# Patient Record
Sex: FEMALE | Race: WHITE | NOT HISPANIC OR LATINO | RURAL
[De-identification: names, ages, dates, MRNs, and addresses within clinical notes are randomized per-mention and may not be internally consistent; named-entity substitution may affect disease eponyms.]

---

## 2022-12-20 ENCOUNTER — LAB OUTSIDE AN ENCOUNTER (OUTPATIENT)
Dept: RURAL CLINIC 4 | Facility: CLINIC | Age: 57
End: 2022-12-20

## 2022-12-20 ENCOUNTER — OFFICE VISIT (OUTPATIENT)
Dept: RURAL CLINIC 4 | Facility: CLINIC | Age: 57
End: 2022-12-20
Payer: COMMERCIAL

## 2022-12-20 ENCOUNTER — DASHBOARD ENCOUNTERS (OUTPATIENT)
Age: 57
End: 2022-12-20

## 2022-12-20 VITALS
SYSTOLIC BLOOD PRESSURE: 160 MMHG | BODY MASS INDEX: 32.96 KG/M2 | HEART RATE: 91 BPM | TEMPERATURE: 97.1 F | HEIGHT: 67 IN | DIASTOLIC BLOOD PRESSURE: 101 MMHG | WEIGHT: 210 LBS

## 2022-12-20 DIAGNOSIS — R13.19 ESOPHAGEAL DYSPHAGIA: ICD-10-CM

## 2022-12-20 DIAGNOSIS — K92.1 MELENA: ICD-10-CM

## 2022-12-20 DIAGNOSIS — K51.918 ULCERATIVE COLITIS WITH OTHER COMPLICATION, UNSPECIFIED LOCATION: ICD-10-CM

## 2022-12-20 DIAGNOSIS — R19.8 TENESMUS (RECTAL): ICD-10-CM

## 2022-12-20 PROCEDURE — 99204 OFFICE O/P NEW MOD 45 MIN: CPT | Performed by: INTERNAL MEDICINE

## 2022-12-20 RX ORDER — ALBUTEROL SULFATE 90 UG/1
1 PUFF AS NEEDED AEROSOL, METERED RESPIRATORY (INHALATION)
Status: ACTIVE | COMMUNITY

## 2022-12-20 RX ORDER — SODIUM, POTASSIUM,MAG SULFATES 17.5-3.13G
177ML SOLUTION, RECONSTITUTED, ORAL ORAL
Qty: 1 | Refills: 0 | OUTPATIENT
Start: 2022-12-20 | End: 2022-12-22

## 2022-12-20 RX ORDER — PANTOPRAZOLE SODIUM 40 MG/1
1 TABLET TABLET, DELAYED RELEASE ORAL TWICE A DAY
Qty: 60 TABLET | Refills: 4 | OUTPATIENT
Start: 2022-12-20

## 2022-12-20 RX ORDER — HYDROXYZINE PAMOATE 25 MG/1
1 CAPSULE AT BEDTIME AS NEEDED CAPSULE ORAL ONCE A DAY
Status: ACTIVE | COMMUNITY

## 2022-12-20 RX ORDER — FLUTICASONE PROPIONATE 220 UG/1
2 PUFFS AEROSOL, METERED RESPIRATORY (INHALATION) TWICE A DAY
Status: ACTIVE | COMMUNITY

## 2022-12-20 RX ORDER — RIZATRIPTAN BENZOATE 10 MG/1
1 TABLET TABLET ORAL ONCE A DAY
Status: ACTIVE | COMMUNITY

## 2022-12-20 RX ORDER — DICLOFENAC SODIUM 75 MG/1
1 TABLET AS NEEDED TABLET, DELAYED RELEASE ORAL TWICE A DAY
Status: ACTIVE | COMMUNITY

## 2022-12-20 RX ORDER — HYOSCYAMINE SULFATE 0.12 MG/1
1 TABLET AS NEEDED TABLET ORAL
Qty: 120 TABLET | Refills: 0 | OUTPATIENT
Start: 2022-12-20 | End: 2023-01-19

## 2022-12-20 RX ORDER — GABAPENTIN 300 MG/1
1 CAPSULE CAPSULE ORAL ONCE A DAY
Status: ACTIVE | COMMUNITY

## 2022-12-20 RX ORDER — DULOXETINE 30 MG/1
1 CAPSULE CAPSULE, DELAYED RELEASE PELLETS ORAL ONCE A DAY
Status: ACTIVE | COMMUNITY

## 2022-12-20 NOTE — HPI-TODAY'S VISIT:
The patient is a 56-year-old female who presents on referral from Dr. Magy Newsome for a history of Burk's esophagus and ulcerative colitis with current mild flare.  A copy of this document to be sent to the referring provider.  The patient was diagnosed with Burk's esophagus in 2016 and is currently on pantoprazole 40 mg daily.  She reports intermittent epigastric abdominal pain, mild in severity and typically occurring after eating and difficulty swallowing solids with episodes of choking for the past several weeks.  She denies weight loss, heartburn,  nausea or vomiting.  She has noticed intermittent black stools over the past 6 weeks with the last occurrence 1 week ago.  She admits to taking Goody  powder  with aspirin up to 4 times per day for chronic pain.  Current alcohol use of 5 to 6 drinks per day, 4 to 5 days a week.    History of ulcerative colitis diagnosed at the age of 18 and was initially taking Lialda and on occasion mesalamine suppositories.  All medication was stopped approximately 10 years ago secondary to noncompliance.  She recalls taking prednisone with slow tapering several times in the past.  The patient is a poor historian and thinks her last colonoscopy and upper endoscopy were both done in 2016.  Currently she reports lower abdominal cramping that typically responds to an antispasmodic and  mucousy stools occurring up to 4 times per day.  She denies hematochezia and reports intermittent black stools.  The patient was seen by her primary care provider 2 weeks ago and presented with hypertension and was prescribed antihypertensive medication.  She reportedly has not filled the prescription as of yet and her blood pressure remains elevated today.

## 2023-01-25 PROBLEM — 64766004: Status: ACTIVE | Noted: 2022-12-20

## 2023-01-25 PROBLEM — 54586004: Status: ACTIVE | Noted: 2022-12-20

## 2023-01-25 PROBLEM — 16093611000119107: Status: ACTIVE | Noted: 2022-12-20

## 2023-01-25 PROBLEM — 2901004: Status: ACTIVE | Noted: 2022-12-20

## 2023-01-25 PROBLEM — 6548007: Status: ACTIVE | Noted: 2022-12-20

## 2023-01-25 PROBLEM — 40890009: Status: ACTIVE | Noted: 2022-12-20

## 2023-01-25 PROBLEM — 249489001: Status: ACTIVE | Noted: 2022-12-20

## 2023-02-23 ENCOUNTER — OFFICE VISIT (OUTPATIENT)
Dept: RURAL MEDICAL CENTER 4 | Facility: MEDICAL CENTER | Age: 58
End: 2023-02-23

## 2023-03-24 ENCOUNTER — CLAIMS CREATED FROM THE CLAIM WINDOW (OUTPATIENT)
Dept: RURAL MEDICAL CENTER 4 | Facility: MEDICAL CENTER | Age: 58
End: 2023-03-24

## 2023-03-24 ENCOUNTER — OFFICE VISIT (OUTPATIENT)
Dept: RURAL MEDICAL CENTER 4 | Facility: MEDICAL CENTER | Age: 58
End: 2023-03-24

## 2023-03-24 ENCOUNTER — CLAIMS CREATED FROM THE CLAIM WINDOW (OUTPATIENT)
Dept: RURAL MEDICAL CENTER 4 | Facility: MEDICAL CENTER | Age: 58
End: 2023-03-24
Payer: COMMERCIAL

## 2023-03-24 DIAGNOSIS — K22.2 ACQUIRED ESOPHAGEAL RING: ICD-10-CM

## 2023-03-24 DIAGNOSIS — K31.89 ACQUIRED DEFORMITY OF DUODENUM: ICD-10-CM

## 2023-03-24 DIAGNOSIS — K22.89 DILATATION OF ESOPHAGUS: ICD-10-CM

## 2023-03-24 DIAGNOSIS — R19.7 ACUTE DIARRHEA: ICD-10-CM

## 2023-03-24 DIAGNOSIS — K51.80 CHRONIC PANCOLONIC ULCERATIVE COLITIS: ICD-10-CM

## 2023-03-24 DIAGNOSIS — R13.19 CERVICAL DYSPHAGIA: ICD-10-CM

## 2023-03-24 PROCEDURE — 43239 EGD BIOPSY SINGLE/MULTIPLE: CPT | Performed by: INTERNAL MEDICINE

## 2023-03-24 PROCEDURE — 43249 ESOPH EGD DILATION <30 MM: CPT | Performed by: INTERNAL MEDICINE

## 2023-03-24 PROCEDURE — 45380 COLONOSCOPY AND BIOPSY: CPT | Performed by: INTERNAL MEDICINE

## 2024-06-24 ENCOUNTER — WEB ENCOUNTER (OUTPATIENT)
Dept: RURAL CLINIC 8 | Facility: CLINIC | Age: 59
End: 2024-06-24

## 2024-06-24 ENCOUNTER — WEB ENCOUNTER (OUTPATIENT)
Dept: RURAL CLINIC 4 | Facility: CLINIC | Age: 59
End: 2024-06-24

## 2024-06-24 RX ORDER — PANTOPRAZOLE SODIUM 40 MG/1
1 TABLET TABLET, DELAYED RELEASE ORAL TWICE A DAY
Qty: 60 TABLET | Refills: 2 | OUTPATIENT
Start: 2024-06-24

## 2024-07-09 ENCOUNTER — OFFICE VISIT (OUTPATIENT)
Dept: RURAL CLINIC 2 | Facility: CLINIC | Age: 59
End: 2024-07-09
Payer: COMMERCIAL

## 2024-07-09 ENCOUNTER — LAB OUTSIDE AN ENCOUNTER (OUTPATIENT)
Dept: RURAL CLINIC 2 | Facility: CLINIC | Age: 59
End: 2024-07-09

## 2024-07-09 VITALS
SYSTOLIC BLOOD PRESSURE: 133 MMHG | TEMPERATURE: 98.8 F | HEART RATE: 72 BPM | DIASTOLIC BLOOD PRESSURE: 93 MMHG | WEIGHT: 200 LBS | HEIGHT: 67 IN | BODY MASS INDEX: 31.39 KG/M2

## 2024-07-09 DIAGNOSIS — R13.19 CERVICAL DYSPHAGIA: ICD-10-CM

## 2024-07-09 DIAGNOSIS — K21.9 GASTROESOPHAGEAL REFLUX DISEASE WITHOUT ESOPHAGITIS: ICD-10-CM

## 2024-07-09 PROBLEM — 271834000: Status: ACTIVE | Noted: 2024-07-09

## 2024-07-09 PROBLEM — 235623002: Status: ACTIVE | Noted: 2024-07-09

## 2024-07-09 PROBLEM — 266435005: Status: ACTIVE | Noted: 2024-07-09

## 2024-07-09 PROCEDURE — 99214 OFFICE O/P EST MOD 30 MIN: CPT | Performed by: NURSE PRACTITIONER

## 2024-07-09 RX ORDER — HYDROXYZINE PAMOATE 25 MG/1
1 CAPSULE AT BEDTIME AS NEEDED CAPSULE ORAL ONCE A DAY
Status: ACTIVE | COMMUNITY

## 2024-07-09 RX ORDER — FLUTICASONE PROPIONATE 220 UG/1
2 PUFFS AEROSOL, METERED RESPIRATORY (INHALATION) TWICE A DAY
Status: DISCONTINUED | COMMUNITY

## 2024-07-09 RX ORDER — RIZATRIPTAN BENZOATE 10 MG/1
1 TABLET TABLET ORAL ONCE A DAY
Status: ACTIVE | COMMUNITY

## 2024-07-09 RX ORDER — GABAPENTIN 300 MG/1
1 CAPSULE CAPSULE ORAL ONCE A DAY
Status: ACTIVE | COMMUNITY

## 2024-07-09 RX ORDER — ALBUTEROL SULFATE 90 UG/1
1 PUFF AS NEEDED AEROSOL, METERED RESPIRATORY (INHALATION)
Status: DISCONTINUED | COMMUNITY

## 2024-07-09 RX ORDER — PANTOPRAZOLE SODIUM 40 MG/1
1 TABLET TABLET, DELAYED RELEASE ORAL TWICE A DAY
Qty: 60 TABLET | Refills: 2 | Status: ACTIVE | COMMUNITY
Start: 2024-06-24

## 2024-07-09 RX ORDER — DICLOFENAC SODIUM 75 MG/1
1 TABLET AS NEEDED TABLET, DELAYED RELEASE ORAL TWICE A DAY
Status: ACTIVE | COMMUNITY

## 2024-07-09 RX ORDER — DULOXETINE 30 MG/1
1 CAPSULE CAPSULE, DELAYED RELEASE PELLETS ORAL ONCE A DAY
Status: ACTIVE | COMMUNITY

## 2024-07-09 RX ORDER — PANTOPRAZOLE SODIUM 40 MG/1
1 TABLET TABLET, DELAYED RELEASE ORAL TWICE A DAY
Qty: 60 TABLET | Refills: 4 | Status: DISCONTINUED | COMMUNITY
Start: 2022-12-20

## 2024-07-09 RX ORDER — DEXAMETHASONE 4 MG/1
1 TABLET TABLET ORAL ONCE A DAY
Status: ACTIVE | COMMUNITY

## 2024-07-09 NOTE — HPI-TODAY'S VISIT:
The patient is a 56-year-old female who presents on referral from Dr. Magy Newsome for a history of Burk's esophagus and ulcerative colitis with current mild flare.  A copy of this document to be sent to the referring provider.  The patient was diagnosed with Burk's esophagus in 2016 and is currently on pantoprazole 40 mg daily.  She reports intermittent epigastric abdominal pain, mild in severity and typically occurring after eating and difficulty swallowing solids with episodes of choking for the past several weeks.  She denies weight loss, heartburn,  nausea or vomiting.  She has noticed intermittent black stools over the past 6 weeks with the last occurrence 1 week ago.  She admits to taking Goody  powder  with aspirin up to 4 times per day for chronic pain.  Current alcohol use of 5 to 6 drinks per day, 4 to 5 days a week.    History of ulcerative colitis diagnosed at the age of 18 and was initially taking Lialda and on occasion mesalamine suppositories.  All medication was stopped approximately 10 years ago secondary to noncompliance.  She recalls taking prednisone with slow tapering several times in the past.  The patient is a poor historian and thinks her last colonoscopy and upper endoscopy were both done in 2016.  Currently she reports lower abdominal cramping that typically responds to an antispasmodic and  mucousy stools occurring up to 4 times per day.  She denies hematochezia and reports intermittent black stools.  The patient was seen by her primary care provider 2 weeks ago and presented with hypertension and was prescribed antihypertensive medication.  She reportedly has not filled the prescription as of yet and her blood pressure remains elevated today. 07/09/2024 The pt is here with c/o difficulty swallowing with episodes of choking for the past few weeks. She is also c/o excessive belching. Previous EGD reviewed with findings a Schatzki ring, dilated to 18 mm.   3 weeks ago she reported having a few bouts of rectal bleeding and felt constipated however she was moving her bowels 2-4 times per day. She was having joint issues around the same time requiring steroid tx.  She said she is no longer having rectal bleeding and bowel patten is now back to normal. Previous colonoscopy one yr reviewed with benign findings.

## 2024-07-18 ENCOUNTER — OFFICE VISIT (OUTPATIENT)
Dept: RURAL MEDICAL CENTER 4 | Facility: MEDICAL CENTER | Age: 59
End: 2024-07-18

## 2024-07-18 RX ORDER — DEXAMETHASONE 4 MG/1
1 TABLET TABLET ORAL ONCE A DAY
Status: ACTIVE | COMMUNITY

## 2024-07-18 RX ORDER — PANTOPRAZOLE SODIUM 40 MG/1
1 TABLET TABLET, DELAYED RELEASE ORAL TWICE A DAY
Qty: 60 TABLET | Refills: 2 | Status: ACTIVE | COMMUNITY
Start: 2024-06-24

## 2024-07-18 RX ORDER — CLOTRIMAZOLE 10 MG/1
1 TROCHE LOZENGE ORAL; TOPICAL THREE TIMES A DAY
Qty: 30 | Refills: 0 | OUTPATIENT
Start: 2024-07-18 | End: 2024-07-28

## 2024-07-18 RX ORDER — GABAPENTIN 300 MG/1
1 CAPSULE CAPSULE ORAL ONCE A DAY
Status: ACTIVE | COMMUNITY

## 2024-07-18 RX ORDER — RIZATRIPTAN BENZOATE 10 MG/1
1 TABLET TABLET ORAL ONCE A DAY
Status: ACTIVE | COMMUNITY

## 2024-07-18 RX ORDER — DULOXETINE 30 MG/1
1 CAPSULE CAPSULE, DELAYED RELEASE PELLETS ORAL ONCE A DAY
Status: ACTIVE | COMMUNITY

## 2024-07-18 RX ORDER — DICLOFENAC SODIUM 75 MG/1
1 TABLET AS NEEDED TABLET, DELAYED RELEASE ORAL TWICE A DAY
Status: ACTIVE | COMMUNITY

## 2024-07-18 RX ORDER — HYDROXYZINE PAMOATE 25 MG/1
1 CAPSULE AT BEDTIME AS NEEDED CAPSULE ORAL ONCE A DAY
Status: ACTIVE | COMMUNITY

## 2024-12-03 ENCOUNTER — TELEPHONE ENCOUNTER (OUTPATIENT)
Dept: RURAL CLINIC 2 | Facility: CLINIC | Age: 59
End: 2024-12-03

## 2024-12-03 RX ORDER — PANTOPRAZOLE SODIUM 40 MG/1
1 TABLET TABLET, DELAYED RELEASE ORAL TWICE A DAY
Qty: 180 | Refills: 2
Start: 2024-06-24